# Patient Record
Sex: FEMALE | Race: WHITE | NOT HISPANIC OR LATINO | Employment: FULL TIME | URBAN - METROPOLITAN AREA
[De-identification: names, ages, dates, MRNs, and addresses within clinical notes are randomized per-mention and may not be internally consistent; named-entity substitution may affect disease eponyms.]

---

## 2017-08-18 ENCOUNTER — TELEPHONE (OUTPATIENT)
Dept: CARDIOLOGY | Facility: CLINIC | Age: 37
End: 2017-08-18

## 2017-09-11 ENCOUNTER — TELEPHONE (OUTPATIENT)
Dept: OBSTETRICS AND GYNECOLOGY | Facility: CLINIC | Age: 37
End: 2017-09-11

## 2017-12-28 ENCOUNTER — OFFICE VISIT (OUTPATIENT)
Dept: OBSTETRICS AND GYNECOLOGY | Facility: CLINIC | Age: 37
End: 2017-12-28

## 2017-12-28 VITALS — HEIGHT: 67 IN | WEIGHT: 171.94 LBS | BODY MASS INDEX: 26.99 KG/M2

## 2017-12-28 DIAGNOSIS — N80.9 ENDOMETRIOSIS: ICD-10-CM

## 2017-12-28 DIAGNOSIS — Z01.419 ENCOUNTER FOR GYNECOLOGICAL EXAMINATION: ICD-10-CM

## 2017-12-28 DIAGNOSIS — N88.9 CERVICAL LESION: ICD-10-CM

## 2017-12-28 DIAGNOSIS — Z12.4 ENCOUNTER FOR PAPANICOLAOU SMEAR FOR CERVICAL CANCER SCREENING: ICD-10-CM

## 2017-12-28 DIAGNOSIS — Z12.39 BREAST CANCER SCREENING: ICD-10-CM

## 2017-12-28 DIAGNOSIS — Z11.51 ENCOUNTER FOR SCREENING FOR HUMAN PAPILLOMAVIRUS (HPV): Primary | ICD-10-CM

## 2017-12-28 PROCEDURE — 88305 TISSUE EXAM BY PATHOLOGIST: CPT | Mod: 26,,, | Performed by: PATHOLOGY

## 2017-12-28 PROCEDURE — 99213 OFFICE O/P EST LOW 20 MIN: CPT | Mod: PBBFAC,PN,25 | Performed by: OBSTETRICS & GYNECOLOGY

## 2017-12-28 PROCEDURE — 88342 IMHCHEM/IMCYTCHM 1ST ANTB: CPT | Mod: 26,,, | Performed by: PATHOLOGY

## 2017-12-28 PROCEDURE — 99999 PR PBB SHADOW E&M-EST. PATIENT-LVL III: CPT | Mod: PBBFAC,,, | Performed by: OBSTETRICS & GYNECOLOGY

## 2017-12-28 PROCEDURE — 88305 TISSUE EXAM BY PATHOLOGIST: CPT | Performed by: PATHOLOGY

## 2017-12-28 PROCEDURE — 99385 PREV VISIT NEW AGE 18-39: CPT | Mod: S$PBB,,, | Performed by: OBSTETRICS & GYNECOLOGY

## 2017-12-28 NOTE — PROGRESS NOTES
"CC: Well woman exam New pt    Faith Fan is a 37 y.o. female  presents for a well woman exam.  She is not established.  LMP: Patient's last menstrual period was 2017..   Annual Exam,   Last pap 2016 normal per pt.   H/o endometriosis, laparoscopy last year for endo. Also previous elevated ca125 level, had normal ultrasound Saw Dr kim Bustos - records requested yesterday    37-year-old  was seen by Dr. Kim Bustos previously.  On exam patient states that her uterus felt like "it was out of her abdomen" so Dr. Bustos did an ultrasound.  On ultrasound the patient states that she saw endometriosis??  Endometrioma.  Dr. Bustos ordered a CA-125 when she was on her period which was elevated and off of her menstrual cycle it was normal.  Dr. Bustos did a laparoscopy where she cleaned at the endometriosis but when she repeated the CA-125 test it continued to be elevated on her cycle and was normal again off of her cycle.  At that time the patient was considering pregnancy and was not placed on oral contraceptives.  We discussed that the best treatment for Endo is OCPs.  We also discussed at length that I would like to get her records to figure out exactly what happened at the laparoscopy as well as on the ultrasounds and we would make a further decision about follow-up therapy but my thought was that the patient would benefit from  oral contraceptives    Currently the patient's cycles are regular and monthly.  She does have some mild cramps which are relieved with Advil.  Patient states the only time she has pain is sometimes with ovulation.  Otherwise has a pretty benign menstrual history.  Her cycles last only 3-4 days and are monthly and regular     Health Maintenance   Topic Date Due    Lipid Panel  1979    Influenza Vaccine  2017    Pap Smear with HPV Cotest  2019    TETANUS VACCINE  2026         Past Medical History:   Diagnosis Date    Anxiety     " "Chicken pox     Endometriosis        Past Surgical History:   Procedure Laterality Date    PELVIC LAPAROSCOPY  2016    endo    removal of endometrial tissue  2016       OB History    Para Term  AB Living   2 2 2     2   SAB TAB Ectopic Multiple Live Births           2      # Outcome Date GA Lbr Jonnathan/2nd Weight Sex Delivery Anes PTL Lv   2 Term  40w0d   F Vag-Spont   TOYIN   1 Term  40w0d   F Vag-Spont   TOYIN          Family History   Problem Relation Age of Onset    Anxiety disorder Mother     Hyperlipidemia Father     Heart attack Father     Hypertension Father     CHRISTIANO disease Father     No Known Problems Sister     Hypertension Daughter        Social History   Substance Use Topics    Smoking status: Never Smoker    Smokeless tobacco: Never Used    Alcohol use No       Ht 5' 7" (1.702 m)   Wt 78 kg (171 lb 15.3 oz)   LMP 2017   BMI 26.93 kg/m²       ROS:  GENERAL: Denies weight gain or weight loss. Feeling well overall.   SKIN: Denies rash or lesions.   HEAD: Denies head injury or headache.   NODES: Denies enlarged lymph nodes.   CHEST: Denies chest pain or shortness of breath.   CARDIOVASCULAR: Denies palpitations or left sided chest pain.   ABDOMEN: No abdominal pain, constipation, diarrhea, nausea, vomiting or rectal bleeding.   URINARY: No frequency, dysuria, hematuria, or burning on urination.    Physical Exam:    APPEARANCE: Well nourished, well developed, in no acute distress.  AFFECT: WNL, alert and oriented x 3  SKIN: No acne or hirsutism  ABDOMEN: Soft.  No tenderness or masses.  No hepatosplenomegaly.  No hernias.  BREASTS: Symmetrical, no skin changes or visible lesions.  No palpable masses, nipple discharge bilaterally.  PELVIC: Normal external genitalia without lesions.  Normal hair distribution.  Adequate perineal body, normal urethral meatus.  Vagina moist and well rugated without lesions or discharge.  Cervix pink, with a 3 mm thickening at the 6:00 " position of the cervix.  This area was biopsied.  Cardiologist extraction health issues for examination to determine presence of extensive disease No  discharge or tenderness.  No significant cystocele or rectocele.  Bimanual exam shows uterus to be normal size, regular, mobile and nontender.  Adnexa without masses or tenderness.    EXTREMITIES: No edema.    ASSESSMENT AND PLAN  1. Encounter for screening for human papillomavirus (HPV)  HPV DNA (High Risk)    CANCELED: HPV High Risk Genotypes, PCR   2. Encounter for Papanicolaou smear for cervical cancer screening  Liquid-based pap smear, screening    CANCELED: Liquid-based pap smear, screening   3. Breast cancer screening  Mammo Digital Screening Bilat with Tomosynthesis CAD   4. Encounter for gynecological examination     5. Cervical lesion  Tissue Specimen To Pathology, Obstetrics/Gynecology   6. Endometriosis   need to get records to determine the extent of endometriosis as well as previous ultrasound findings.  Patient currently has no insurance.  She will call us back when she gets her insurance and we will figure out further management but my thought is that her first step is to be placed on oral contraceptives.    My biggest concern is the questionable CA-125 levels will was seen on ultrasound and what was seen at the time of her laparoscopy therefore I would like to get her records and review.         Patient was counseled today on A.C.S. Pap guidelines and recommendations for yearly pelvic exams, mammograms and monthly self breast exams; to see her PCP for other health maintenance.     Return in about 5 weeks (around 2/1/2018).

## 2018-01-02 LAB
CYTOLOGIST CVX/VAG CYTO: NORMAL
DX ICD CODE: NORMAL
HPV I/H RISK 1 DNA CVX QL PROBE+SIG AMP: NEGATIVE
Lab: NORMAL
OTHER STN SPEC: NORMAL
PATH REPORT.FINAL DX SPEC: NORMAL
STAT OF ADQ CVX/VAG CYTO-IMP: NORMAL

## 2018-01-05 ENCOUNTER — TELEPHONE (OUTPATIENT)
Dept: OBSTETRICS AND GYNECOLOGY | Facility: CLINIC | Age: 38
End: 2018-01-05

## 2018-01-08 ENCOUNTER — TELEPHONE (OUTPATIENT)
Dept: OBSTETRICS AND GYNECOLOGY | Facility: CLINIC | Age: 38
End: 2018-01-08

## 2018-01-08 NOTE — TELEPHONE ENCOUNTER
Pt was called and notified of Negative, no abnormal cells on cervical lesion bx from 12-28-17. Pt understood

## 2018-01-08 NOTE — TELEPHONE ENCOUNTER
----- Message from Castillo Moore MD sent at 1/8/2018 12:48 PM CST -----  Please call   cervical biopsy was normal No abnormal cells